# Patient Record
Sex: MALE | Race: WHITE | NOT HISPANIC OR LATINO | Employment: OTHER | URBAN - METROPOLITAN AREA
[De-identification: names, ages, dates, MRNs, and addresses within clinical notes are randomized per-mention and may not be internally consistent; named-entity substitution may affect disease eponyms.]

---

## 2023-07-24 ENCOUNTER — PRE VISIT (OUTPATIENT)
Dept: ONCOLOGY | Facility: CLINIC | Age: 73
End: 2023-07-24
Payer: COMMERCIAL

## 2023-07-24 ENCOUNTER — TRANSCRIBE ORDERS (OUTPATIENT)
Dept: OTHER | Age: 73
End: 2023-07-24

## 2023-07-24 DIAGNOSIS — D64.9 LOW HEMOGLOBIN: Primary | ICD-10-CM

## 2023-07-24 NOTE — TELEPHONE ENCOUNTER
Action Taken  Date/Description  TJ   WT from [facility] July 24, 2023  4:03 PM   Call from PT's friend Argenis to get a 2nd opinion for low hemoglobin.  Pt has been seen at [facility], records in [place].  Argenis will be helping to get him scheduled etc.

## 2023-07-25 ENCOUNTER — PATIENT OUTREACH (OUTPATIENT)
Dept: ONCOLOGY | Facility: CLINIC | Age: 73
End: 2023-07-25
Payer: COMMERCIAL

## 2023-07-25 NOTE — PROGRESS NOTES
Writer placed call to Argenis who called in referral for second opinion of anemia currently being treated by Dr BERNABE Garrett of [facility]. Per chart review, recommendation was for bone marrow biopsy to be done. Discussed with Argenis that he appears to have some upcoming appointments and need for marrow, would like for that to be done so we can have him see correct provider type. Argenis is unsure of what his upcoming appointments are for. She will discuss with Jelani and see about getting marrow done so we can review results and coordinate visit with Classical Hematology vs Malignant Hematology at [facility] based on results. Argenis voiced understanding and will contact Jelani for follow up and notify writer on direct line provided to her. Referral temporarily deferred.

## 2023-10-26 ENCOUNTER — PRE VISIT (OUTPATIENT)
Dept: ONCOLOGY | Facility: CLINIC | Age: 73
End: 2023-10-26

## 2023-12-27 ENCOUNTER — PATIENT OUTREACH (OUTPATIENT)
Dept: ONCOLOGY | Facility: CLINIC | Age: 73
End: 2023-12-27
Payer: COMMERCIAL

## 2023-12-27 NOTE — TELEPHONE ENCOUNTER
Spoke with Argenis,  emergency contact and responsible for appointments.  Discussed appointment with Dr.Jacob Cogan scheduled for 1.5.2024    Noted referral was labelled as anemia, however patient has a confirmed dx of CMML.  Established with  at [facility] and has had a second opinion at [place]    At this Time they do not wish to keep appointment.  They feel they have the care they need.   If they choose to reschedule at a later time, a different provider whom treat CMML would be preferred.